# Patient Record
Sex: FEMALE | Race: OTHER | URBAN - METROPOLITAN AREA
[De-identification: names, ages, dates, MRNs, and addresses within clinical notes are randomized per-mention and may not be internally consistent; named-entity substitution may affect disease eponyms.]

---

## 2020-06-22 ENCOUNTER — NURSE TRIAGE (OUTPATIENT)
Dept: FAMILY MEDICINE CLINIC | Facility: CLINIC | Age: 45
End: 2020-06-22

## 2020-06-22 NOTE — TELEPHONE ENCOUNTER
Triage team will monitor patient until [facility] team takes over. Please DO NOT close this encounter. Infection banner added.         Action Requested: Summary for Provider     []  Critical Lab, Recommendations Needed  [] Need Additional Abelardoin Tyler

## 2020-06-23 ENCOUNTER — PATIENT OUTREACH (OUTPATIENT)
Dept: CASE MANAGEMENT | Age: 45
End: 2020-06-23

## 2020-06-23 NOTE — PROGRESS NOTES
Home Monitoring Condition Update    Covid19+ test date: 6/15/2020 at [facility] per patient      Consent Verification:  Assessment Completed With: Patient  HIPAA Verified?   Yes    COVID-19 HOME MONITORING 6/23/2020   Temperature 98.8   Reading From Forehead hydrated, BRAT diet with small, frequent meals. Advised to take Tylenol q 6 hrs prn fever/pain, not to exceed 3000 mg/day--to call back with worsening symptoms--patient verbalizes understanding and agreement.     Patient agreeable to Care Companion and roberro

## 2020-06-24 ENCOUNTER — PATIENT OUTREACH (OUTPATIENT)
Dept: CASE MANAGEMENT | Age: 45
End: 2020-06-24

## 2020-06-24 NOTE — TELEPHONE ENCOUNTER
[id number] team contacted patient as of 6/23/20. Closing encounter per department process.

## 2020-06-24 NOTE — PROGRESS NOTES
Home Monitoring Condition Update    Covid19+ test date: 6/15/2020 at [facility] per patient      Consent Verification:  Assessment Completed With: Patient  HIPAA Verified?   Yes    COVID-19 HOME MONITORING 6/24/2020   Temperature 97.6   Reading From Forehead cleared by PCP at tomorrow's VV--patient verbalizes understanding and agreement. Patient advised to continue monitoring temperature and pulse at least twice a day and record, rest, stay hydrated, BRAT diet with small, frequent meals.  Advised to take Tyl

## 2020-06-26 ENCOUNTER — TELEPHONE (OUTPATIENT)
Dept: CASE MANAGEMENT | Age: 45
End: 2020-06-26

## 2020-06-26 ENCOUNTER — PATIENT OUTREACH (OUTPATIENT)
Dept: CASE MANAGEMENT | Age: 45
End: 2020-06-26

## 2020-06-26 NOTE — TELEPHONE ENCOUNTER
Every 3 days is fine. She seemed mostly well though did report shortness of breath with activity, like walking up stairs. She is high risk due to HTN and DM.  Virtual visit next Thursday please and then I hope to discontinue the program

## 2020-06-26 NOTE — TELEPHONE ENCOUNTER
Spoke with patient for day #4 Covid Home Monitoring (test date 6/15/2020 at [facility] per patient):    Patient had virtual visit yesterday for Covid follow-up: unable to view virtual visit notes at this time--patient reports Dr. Mary Moore would like home monitorin

## 2020-06-26 NOTE — TELEPHONE ENCOUNTER
Spoke with patient and relayed Dr. Chad Church message below--patient verbalizes understanding and agreement. Virtual Covid f/u visit scheduled for Thursday, 7/02/2020 per Dr. Chad Church request. No further questions/concerns at this time.

## 2020-06-26 NOTE — PROGRESS NOTES
Home Monitoring Condition Update    Covid19+ test date: 6/15/2020 at [facility] per patient      Consent Verification:  Assessment Completed With: Patient  HIPAA Verified?   Yes    COVID-19 HOME MONITORING 6/26/2020   Temperature (No Data)   Reading From -   Pu to speak with RN/on call provider--patient verbalizes understanding and agreement. Patient advised to continue monitoring temperature and pulse at least twice a day and record, rest, stay hydrated, BRAT diet with small, frequent meals.  Advised to take T

## 2020-06-29 ENCOUNTER — PATIENT OUTREACH (OUTPATIENT)
Dept: CASE MANAGEMENT | Age: 45
End: 2020-06-29

## 2020-06-29 NOTE — PROGRESS NOTES
Home Monitoring Condition Update        Consent Verification:  Assessment Completed With: Patient  HIPAA Verified?   Yes    COVID-19 HOME MONITORING 6/29/2020   Temperature 98.5   Reading From Forehead   Pulse -   Pulse taken from -   Exertion Level -   H minutes

## 2020-07-02 ENCOUNTER — TELEMEDICINE (OUTPATIENT)
Dept: FAMILY MEDICINE CLINIC | Facility: CLINIC | Age: 45
End: 2020-07-02
Payer: MEDICAID

## 2020-07-02 DIAGNOSIS — U07.1 COVID-19: Primary | ICD-10-CM

## 2020-07-02 PROCEDURE — 99202 OFFICE O/P NEW SF 15 MIN: CPT | Performed by: FAMILY MEDICINE

## 2020-07-02 NOTE — PROGRESS NOTES
HPI: [facility] is a 39year old female who presents for COVID follow-up. Did test on Tueday and she was negative. All symptoms have resolved except for slight cough when she is exposed to air conditioning or drinks cold water.      PMH:    Past Medical History:

## 2020-07-03 ENCOUNTER — TELEPHONE (OUTPATIENT)
Dept: FAMILY MEDICINE CLINIC | Facility: CLINIC | Age: 45
End: 2020-07-03

## (undated) NOTE — Clinical Note
Patient enrolled in Covid+ Home Monitoring program and Care Companion. Patient has virtual visit with you 6/25/2020 to establish care--see 6/22/2020 triage encounter.